# Patient Record
Sex: FEMALE | Race: BLACK OR AFRICAN AMERICAN | Employment: FULL TIME | URBAN - METROPOLITAN AREA
[De-identification: names, ages, dates, MRNs, and addresses within clinical notes are randomized per-mention and may not be internally consistent; named-entity substitution may affect disease eponyms.]

---

## 2017-12-19 ENCOUNTER — HOSPITAL ENCOUNTER (OUTPATIENT)
Dept: LAB | Age: 42
Discharge: HOME | End: 2017-12-19

## 2017-12-19 LAB — RUBV IGG SER-IMP: NORMAL

## 2017-12-19 PROCEDURE — 86762 RUBELLA ANTIBODY: CPT | Performed by: EMERGENCY MEDICINE

## 2017-12-19 PROCEDURE — 86765 RUBEOLA ANTIBODY: CPT | Performed by: EMERGENCY MEDICINE

## 2017-12-19 PROCEDURE — 86787 VARICELLA-ZOSTER ANTIBODY: CPT | Performed by: EMERGENCY MEDICINE

## 2017-12-19 PROCEDURE — 86706 HEP B SURFACE ANTIBODY: CPT | Performed by: EMERGENCY MEDICINE

## 2017-12-19 PROCEDURE — 86735 MUMPS ANTIBODY: CPT | Performed by: EMERGENCY MEDICINE

## 2017-12-20 LAB
HBV SURFACE AB SER QL IA: POSITIVE
HBV SURFACE AB SERPL IA-ACNC: 34.38 MIU/ML
HEP BS AB COMMENT,HBSAC: NORMAL
MEV IGG SER IA-ACNC: 52.3 AU/ML
MUV IGG SER IA-ACNC: 72.3 AU/ML
VZV IGG SER IA-ACNC: 990 INDEX

## 2018-06-27 ENCOUNTER — OFFICE VISIT (OUTPATIENT)
Dept: FAMILY MEDICINE CLINIC | Age: 43
End: 2018-06-27

## 2018-06-27 VITALS
OXYGEN SATURATION: 98 % | DIASTOLIC BLOOD PRESSURE: 78 MMHG | HEART RATE: 89 BPM | RESPIRATION RATE: 18 BRPM | WEIGHT: 240 LBS | HEIGHT: 64 IN | TEMPERATURE: 98.7 F | BODY MASS INDEX: 40.97 KG/M2 | SYSTOLIC BLOOD PRESSURE: 128 MMHG

## 2018-06-27 DIAGNOSIS — Z23 NEED FOR TUBERCULOSIS VACCINATION: ICD-10-CM

## 2018-06-27 DIAGNOSIS — Z02.1 PHYSICAL EXAM, PRE-EMPLOYMENT: Primary | ICD-10-CM

## 2018-06-27 DIAGNOSIS — M79.89 LEG SWELLING: ICD-10-CM

## 2018-06-27 NOTE — PROGRESS NOTES
Rah Chiang is a 40 y.o. female here today with c/o bilateral lower leg swelling. She works 12 hour shifts and commutes a total of 4 hours every day to work. She wears compression stockings which helps but it usually takes a couple of days after she isn't working for the swelling to go down. She sees Dr. Beryle Mitts in [place] for therapy. Her last pap was this year by Dr. Millie Stout. Learning assessment completed.

## 2018-06-27 NOTE — PROGRESS NOTES
HISTORY OF PRESENT ILLNESS  Jonathan Chowdhury is a 40 y.o. female. HPI  Jonathan Chowdhury is a 40 y.o. female who presents to the office today to establish care. She is a new patient. She needs a pre-employment physical for a potential new job. She comes in with c/o bilateral LE edema. 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 She also has to drive 2 hours to work and 2 hours home. She has been wearing compression socks which help some. When she is not working her legs are not swollen. She has gained weight since starting this job as well. The only food options she has while at work is a snack machine. She also does not have water there, so this limits what she can drink at work. She was working 3 jobs prior to this and did not have this problem because she was always on the go. Chief Complaint   Patient presents with    Complete Physical    Swelling       No current outpatient prescriptions on file prior to visit. No current facility-administered medications on file prior to visit. No Known Allergies  Past Medical History:   Diagnosis Date    Anemia [facility]     Depression     Nosebleed     Thrombocytopenia (HCC)      History   Smoking Status    Current Some Day Smoker    Packs/day: 0.10    Last attempt to quit: 1/1/2004   Smokeless Tobacco    Never Used     History   Alcohol Use    Yes     Family History   Problem Relation Age of Onset    Hypertension Mother     Heart Disease Mother     Hypertension Father     Cancer Maternal Grandmother     Cancer Maternal Grandfather     Cancer Other      Maternal cousin       Review of Systems   Constitutional: Negative for chills, diaphoresis, fever, malaise/fatigue and weight loss. Weight gain   Respiratory: Negative for cough, hemoptysis and shortness of breath. Cardiovascular: Positive for leg swelling. Negative for chest pain and palpitations.    Gastrointestinal: Negative for abdominal pain, constipation, diarrhea, nausea and vomiting. Musculoskeletal: Negative for falls. Skin: Negative for rash. Neurological: Negative for dizziness and headaches. Endo/Heme/Allergies: Does not bruise/bleed easily. Psychiatric/Behavioral: Negative for depression. The patient is not nervous/anxious. Visit Vitals    /78 (BP 1 Location: Right arm, BP Patient Position: Sitting)    Pulse 89    Temp 98.7 °F (37.1 °C) (Oral)    Resp 18    Ht 5' 4\" (1.626 m)    Wt 240 lb (108.9 kg)    SpO2 98%    BMI 41.2 kg/m2     Physical Exam   Constitutional: She is oriented to person, place, and time. She appears well-developed and well-nourished. No distress. Neck: Neck supple. No thyromegaly present. Cardiovascular: Normal rate, regular rhythm and normal heart sounds. Pulses:       Radial pulses are 2+ on the right side, and 2+ on the left side. Pulmonary/Chest: Effort normal and breath sounds normal. No respiratory distress. She has no wheezes. She has no rales. Musculoskeletal: She exhibits edema. Minimal trace LE edema   Neurological: She is alert and oriented to person, place, and time. Psychiatric: She has a normal mood and affect. Her behavior is normal. Thought content normal.   Nursing note and vitals reviewed. ASSESSMENT and PLAN    ICD-10-CM ICD-9-CM    1. Physical exam, pre-employment Z02.1 V70.5    2. Need for tuberculosis vaccination Z23 V03.2 AMB POC TUBERCULOSIS, INTRADERMAL (SKIN TEST)   3. Leg swelling M79.89 729.81    4. Class 3 obesity without serious comorbidity with body mass index (BMI) of 40.0 to 44.9 in adult, unspecified obesity type [facility] E66.9 278.00     Z68.41 V85.41      Completed physical form. She will try to track down her previous vaccinations. If she cannot find them, will need to order titers. PPD today. 2 day follow up for reading. We had a long discussion about her LE swelling and weight gain.  This is likely due to her current sedentary job, not drinking enough water, and eating salty foods and needs to make the appropriate changes. If symptoms worsen or develop symptoms concerning for DVT/PE, go directly to the ED. Reviewed medication and side effects. Patient agrees with the plan and verbalizes understanding. Follow-up Disposition:  Return in about 3 months (around 9/27/2018) for swelling and weight loss. Landry Mcclain PA-C  6/27/2018        Discussed the patient's BMI with her. The BMI follow up plan is as follows:     dietary management education, guidance, and counseling as discussed at visit.  encourage exercise  monitor weight    An After Visit Summary was printed and given to the patient.

## 2018-06-27 NOTE — PATIENT INSTRUCTIONS
Low Sodium Diet (2,000 Milligram): Care Instructions  Your Care Instructions    Too much sodium causes your body to hold on to extra water. This can raise your blood pressure and force your heart and kidneys to work harder. In very serious cases, this could cause you to be put in the hospital. It might even be life-threatening. By limiting sodium, you will feel better and lower your risk of serious problems. The most common source of sodium is salt. People get most of the salt in their diet from canned, prepared, and packaged foods. Fast food and restaurant meals also are very high in sodium. Your doctor will probably limit your sodium to less than 2,000 milligrams (mg) a day. This limit counts all the sodium in prepared and packaged foods and any salt you add to your food. Follow-up care is a key part of your treatment and safety. Be sure to make and go to all appointments, and call your doctor if you are having problems. It's also a good idea to know your test results and keep a list of the medicines you take. How can you care for yourself at home? Read food labels  · Read labels on cans and food packages. The labels tell you how much sodium is in each serving. Make sure that you look at the serving size. If you eat more than the serving size, you have eaten more sodium. · Food labels also tell you the Percent Daily Value for sodium. Choose products with low Percent Daily Values for sodium. · Be aware that sodium can come in forms other than salt, including monosodium glutamate (MSG), sodium citrate, and sodium bicarbonate (baking soda). MSG is often added to Asian food. When you eat out, you can sometimes ask for food without MSG or added salt. Buy low-sodium foods  · Buy foods that are labeled \"unsalted\" (no salt added), \"sodium-free\" (less than 5 mg of sodium per serving), or \"low-sodium\" (less than 140 mg of sodium per serving).  Foods labeled \"reduced-sodium\" and \"light sodium\" may still have too much sodium. Be sure to read the label to see how much sodium you are getting. · Buy fresh vegetables, or frozen vegetables without added sauces. Buy low-sodium versions of canned vegetables, soups, and other canned goods. Prepare low-sodium meals  · Cut back on the amount of salt you use in cooking. This will help you adjust to the taste. Do not add salt after cooking. One teaspoon of salt has about 2,300 mg of sodium. · Take the salt shaker off the table. · Flavor your food with garlic, lemon juice, onion, vinegar, herbs, and spices. Do not use soy sauce, lite soy sauce, steak sauce, onion salt, garlic salt, celery salt, mustard, or ketchup on your food. · Use low-sodium salad dressings, sauces, and ketchup. Or make your own salad dressings and sauces without adding salt. · Use less salt (or none) when recipes call for it. You can often use half the salt a recipe calls for without losing flavor. Other foods such as rice, pasta, and grains do not need added salt. · Rinse canned vegetables, and cook them in fresh water. This removes some-but not all-of the salt. · Avoid water that is naturally high in sodium or that has been treated with water softeners, which add sodium. Call your local water company to find out the sodium content of your water supply. If you buy bottled water, read the label and choose a sodium-free brand. Avoid high-sodium foods  · Avoid eating:  ¨ Smoked, cured, salted, and canned meat, fish, and poultry. ¨ Ham, nur, hot dogs, and luncheon meats. ¨ Regular, hard, and processed cheese and regular peanut butter. ¨ Crackers with salted tops, and other salted snack foods such as pretzels, chips, and salted popcorn. ¨ Frozen prepared meals, unless labeled low-sodium. ¨ Canned and dried soups, broths, and bouillon, unless labeled sodium-free or low-sodium. ¨ Canned vegetables, unless labeled sodium-free or low-sodium. ¨ [place] fries, pizza, tacos, and other fast foods.   Antwan Locket, olives, ketchup, and other condiments, especially soy sauce, unless labeled sodium-free or low-sodium. Where can you learn more? Go to http://leyda-brennan.info/. Enter H465 in the search box to learn more about \"Low Sodium Diet (2,000 Milligram): Care Instructions. \"  Current as of: May 12, 2017  Content Version: 11.4  © 0241-3407 [employer]. Care instructions adapted under license by [employer] (which disclaims liability or warranty for this information). If you have questions about a medical condition or this instruction, always ask your healthcare professional. [address] any warranty or liability for your use of this information. Learning About Low-Carbohydrate Diets for Weight Loss  What is a low-carbohydrate diet? Low-carb diets avoid foods that are high in carbohydrate. These high-carb foods include pasta, bread, rice, cereal, fruits, and starchy vegetables. Instead, these diets usually have you eat foods that are high in fat and protein. Many people lose weight quickly on a low-carb diet. But the early weight loss is water. People on this diet often gain the weight back after they start eating carbs again. Not all diet plans are safe or work well. A lot of the evidence shows that low-carb diets aren't healthy. That's because these diets often don't include healthy foods like fruits and vegetables. Losing weight safely means balancing protein, fat, and carbs with every meal and snack. And low-carb diets don't always provide the vitamins, minerals, and fiber you need. If you have a serious medical condition, talk to your doctor before you try any diet. These conditions include kidney disease, heart disease, type 2 diabetes, high cholesterol, and high blood pressure. If you are pregnant, it may not be safe for your baby if you are on a low-carb diet. How can you lose weight safely?   You might have heard that a diet plan helped another person lose weight. But that doesn't mean that it will work for you. It is very hard to stay on a diet that includes lots of big changes in your eating habits. If you want to get to a healthy weight and stay there, making healthy lifestyle changes will often work better than dieting. These steps can help. · Make a plan for change. Work with your doctor to create a plan that is right for you. · See a dietitian. He or she can show you how to make healthy changes in your eating habits. · Manage stress. If you have a lot of stress in your life, it can be hard to focus on making healthy changes to your daily habits. · Track your food and activity. You are likely to do better at losing weight if you keep track of what you eat and what you do. Follow-up care is a key part of your treatment and safety. Be sure to make and go to all appointments, and call your doctor if you are having problems. It's also a good idea to know your test results and keep a list of the medicines you take. Where can you learn more? Go to http://leyda-brennan.info/. Enter A121 in the search box to learn more about \"Learning About Low-Carbohydrate Diets for Weight Loss. \"  Current as of: May 12, 2017  Content Version: 11.4  © 4612-1779 [employer]. Care instructions adapted under license by [employer] (which disclaims liability or warranty for this information). If you have questions about a medical condition or this instruction, always ask your healthcare professional. [place] [address] any warranty or liability for your use of this information. Body Mass Index: Care Instructions  Your Care Instructions    Body mass index (BMI) can help you see if your weight is raising your risk for health problems. It uses a formula to compare how much you weigh with how tall you are. · A BMI lower than 18.5 is considered underweight.   · A BMI between 18.5 and 24.9 is considered healthy. · A BMI between 25 and 29.9 is considered overweight. A BMI of 30 or higher is considered obese. If your BMI is in the normal range, it means that you have a lower risk for weight-related health problems. If your BMI is in the overweight or obese range, you may be at increased risk for weight-related health problems, such as high blood pressure, heart disease, stroke, arthritis or joint pain, and diabetes. If your BMI is in the underweight range, you may be at increased risk for health problems such as fatigue, lower protection (immunity) against illness, muscle loss, bone loss, hair loss, and hormone problems. BMI is just one measure of your risk for weight-related health problems. You may be at higher risk for health problems if you are not active, you eat an unhealthy diet, or you drink too much alcohol or use tobacco products. Follow-up care is a key part of your treatment and safety. Be sure to make and go to all appointments, and call your doctor if you are having problems. It's also a good idea to know your test results and keep a list of the medicines you take. How can you care for yourself at home? · Practice healthy eating habits. This includes eating plenty of fruits, vegetables, whole grains, lean protein, and low-fat dairy. · If your doctor recommends it, get more exercise. Walking is a good choice. Bit by bit, increase the amount you walk every day. Try for at least 30 minutes on most days of the week. · Do not smoke. Smoking can increase your risk for health problems. If you need help quitting, talk to your doctor about stop-smoking programs and medicines. These can increase your chances of quitting for good. · Limit alcohol to 2 drinks a day for men and 1 drink a day for women. Too much alcohol can cause health problems. If you have a BMI higher than 25  · Your doctor may do other tests to check your risk for weight-related health problems.  This may include measuring the distance around your waist. A waist measurement of more than 40 inches in men or 35 inches in women can increase the risk of weight-related health problems. · Talk with your doctor about steps you can take to stay healthy or improve your health. You may need to make lifestyle changes to lose weight and stay healthy, such as changing your diet and getting regular exercise. If you have a BMI lower than 18.5  · Your doctor may do other tests to check your risk for health problems. · Talk with your doctor about steps you can take to stay healthy or improve your health. You may need to make lifestyle changes to gain or maintain weight and stay healthy, such as getting more healthy foods in your diet and doing exercises to build muscle. Where can you learn more? Go to http://leyda-brennan.info/. Enter S176 in the search box to learn more about \"Body Mass Index: Care Instructions. \"  Current as of: October 13, 2016  Content Version: 11.4  © 0819-2999 [employer]. Care instructions adapted under license by [employer] (which disclaims liability or warranty for this information). If you have questions about a medical condition or this instruction, always ask your healthcare professional. [address] any warranty or liability for your use of this information.

## 2018-06-29 ENCOUNTER — CLINICAL SUPPORT (OUTPATIENT)
Dept: FAMILY MEDICINE CLINIC | Age: 43
End: 2018-06-29

## 2018-06-29 DIAGNOSIS — Z23 NEED FOR TUBERCULOSIS VACCINATION: Primary | ICD-10-CM

## 2018-06-29 PROBLEM — M79.89 LEG SWELLING: Status: ACTIVE | Noted: 2018-06-29

## 2018-06-29 PROBLEM — E66.01 OBESITY, MORBID (HCC): Status: ACTIVE | Noted: 2018-06-29

## 2018-06-29 LAB
MM INDURATION POC: 0 MM (ref 0–5)
PPD POC: NEGATIVE NEGATIVE

## 2018-06-29 NOTE — PROGRESS NOTES
Uche Larson is a 40 y.o. female here this afternoon to have her PPD read. It was negative with 0 mm induration. Patient was given a copy of results and left showing no s/s of distress.

## 2018-09-14 ENCOUNTER — OFFICE VISIT (OUTPATIENT)
Dept: FAMILY MEDICINE CLINIC | Age: 43
End: 2018-09-14

## 2018-09-14 VITALS
WEIGHT: 247 LBS | HEART RATE: 96 BPM | TEMPERATURE: 98.5 F | OXYGEN SATURATION: 97 % | BODY MASS INDEX: 42.17 KG/M2 | DIASTOLIC BLOOD PRESSURE: 85 MMHG | RESPIRATION RATE: 18 BRPM | SYSTOLIC BLOOD PRESSURE: 143 MMHG | HEIGHT: 64 IN

## 2018-09-14 DIAGNOSIS — M79.10 MYALGIA: ICD-10-CM

## 2018-09-14 DIAGNOSIS — R51.9 ACUTE NONINTRACTABLE HEADACHE, UNSPECIFIED HEADACHE TYPE: ICD-10-CM

## 2018-09-14 DIAGNOSIS — R50.9 FEELS FEVERISH: Primary | ICD-10-CM

## 2018-09-14 DIAGNOSIS — J02.9 SORE THROAT: ICD-10-CM

## 2018-09-14 DIAGNOSIS — R05.9 COUGH: ICD-10-CM

## 2018-09-14 LAB
QUICKVUE INFLUENZA TEST: NEGATIVE
S PYO AG THROAT QL: NEGATIVE
VALID INTERNAL CONTROL?: YES
VALID INTERNAL CONTROL?: YES

## 2018-09-14 NOTE — MR AVS SNAPSHOT
[address] [place] [address] [address] [place], [place] [address] [address] 
[phone] Patient: Stephani Luz MRN: [id number] :1975 Visit Information Date & Time Provider Department Dept. Phone Encounter #  
 2018  9:15 AM David Malhotra PA-C [employer] [phone] [id number] Follow-up Instructions Return if symptoms worsen or fail to improve. Upcoming Health Maintenance Date Due Pneumococcal 19-64 Medium Risk (1 of 1 - PPSV23) 1995 PAP AKA CERVICAL CYTOLOGY 1997 DTaP/Tdap/Td series (1 - Tdap) 2012 Influenza Age 5 to Adult 2018 Allergies as of 2018  Review Complete On: 2018 By: David Malhotra PA-C No Known Allergies Current Immunizations  Reviewed on 2018 Name Date Hepatitis B Vaccine 2012 Influenza Vaccine Whole 2012 PPD 2012 TB Skin Test (PPD) Intradermal 2018  3:00 PM  
 TD Vaccine 2012 Not reviewed this visit You Were Diagnosed With   
  
 Codes Comments Feels feverish    -  Primary ICD-10-CM: R50.9 ICD-9-CM: 780.60 Myalgia     ICD-10-CM: M79.1 ICD-9-CM: 729.1 Sore throat     ICD-10-CM: J02.9 ICD-9-CM: 544 Cough     ICD-10-CM: R05 ICD-9-CM: 786.2 Acute nonintractable headache, unspecified headache type     ICD-10-CM: R51 ICD-9-CM: 884. 0 Vitals BP Pulse Temp Resp Height(growth percentile) Weight(growth percentile) 143/85 (BP 1 Location: Right arm, BP Patient Position: Sitting) 96 98.5 °F (36.9 °C) (Oral) 18 5' 4\" (1.626 m) 247 lb (112 kg) SpO2 BMI OB Status Smoking Status 97% 42.4 kg/m2 Ablation Current Some Day Smoker Vitals History BMI and BSA Data Body Mass Index Body Surface Area  
 42.4 kg/m 2 2.25 m 2 Your Updated Medication List  
  
Notice  As of 2018 10:09 AM  
 You have not been prescribed any medications. We Performed the Following AMB POC RAPID INFLUENZA TEST [08250 CPT(R)] AMB POC RAPID STREP A [49291 CPT(R)] Follow-up Instructions Return if symptoms worsen or fail to improve. Patient Instructions Cough: Care Instructions Your Care Instructions A cough is your body's response to something that bothers your throat or airways. Many things can cause a cough. You might cough because of a cold or the flu, bronchitis, or asthma. Smoking, postnasal drip, allergies, and stomach acid that backs up into your throat also can cause coughs. A cough is a symptom, not a disease. Most coughs stop when the cause, such as a cold, goes away. You can take a few steps at home to cough less and feel better. Follow-up care is a key part of your treatment and safety. Be sure to make and go to all appointments, and call your doctor if you are having problems. It's also a good idea to know your test results and keep a list of the medicines you take. How can you care for yourself at home? · Drink lots of water and other fluids. This helps thin the mucus and soothes a dry or sore throat. Honey or lemon juice in hot water or tea may ease a dry cough. · Take cough medicine as directed by your doctor. · Prop up your head on pillows to help you breathe and ease a dry cough. · Try cough drops to soothe a dry or sore throat. Cough drops don't stop a cough. Medicine-flavored cough drops are no better than candy-flavored drops or hard candy. · Do not smoke. Avoid secondhand smoke. If you need help quitting, talk to your doctor about stop-smoking programs and medicines. These can increase your chances of quitting for good. When should you call for help? Call 911 anytime you think you may need emergency care. For example, call if: 
  · You have severe trouble breathing.  
 Call your doctor now or seek immediate medical care if: 
  · You cough up blood.  
  · You have new or worse trouble breathing.   · You have a new or higher fever.  
  · You have a new rash.  
 Watch closely for changes in your health, and be sure to contact your doctor if: 
  · You cough more deeply or more often, especially if you notice more mucus or a change in the color of your mucus.  
  · You have new symptoms, such as a sore throat, an earache, or sinus pain.  
  · You do not get better as expected. Where can you learn more? Go to http://leyda-brennan.info/. Enter D279 in the search box to learn more about \"Cough: Care Instructions. \" Current as of: December 6, 2017 Content Version: 11.7 © 2583-6672 [employer]. Care instructions adapted under license by [employer] (which disclaims liability or warranty for this information). If you have questions about a medical condition or this instruction, always ask your healthcare professional. [place] [address] any warranty or liability for your use of this information. Sore Throat: Care Instructions Your Care Instructions Infection by bacteria or a virus causes most sore throats. Cigarette smoke, dry air, air pollution, allergies, and yelling can also cause a sore throat. Sore throats can be painful and annoying. Fortunately, most sore throats go away on their own. If you have a bacterial infection, your doctor may prescribe antibiotics. Follow-up care is a key part of your treatment and safety. Be sure to make and go to all appointments, and call your doctor if you are having problems. It's also a good idea to know your test results and keep a list of the medicines you take. How can you care for yourself at home? · If your doctor prescribed antibiotics, take them as directed. Do not stop taking them just because you feel better. You need to take the full course of antibiotics.  
· Gargle with warm salt water once an hour to help reduce swelling and relieve discomfort. Use 1 teaspoon of salt mixed in 1 cup of warm water. · Take an over-the-counter pain medicine, such as acetaminophen (Tylenol), ibuprofen (Advil, Motrin), or naproxen (Aleve). Read and follow all instructions on the label. · Be careful when taking over-the-counter cold or flu medicines and Tylenol at the same time. Many of these medicines have acetaminophen, which is Tylenol. Read the labels to make sure that you are not taking more than the recommended dose. Too much acetaminophen (Tylenol) can be harmful. · Drink plenty of fluids. Fluids may help soothe an irritated throat. Hot fluids, such as tea or soup, may help decrease throat pain. · Use over-the-counter throat lozenges to soothe pain. Regular cough drops or hard candy may also help. These should not be given to young children because of the risk of choking. · Do not smoke or allow others to smoke around you. If you need help quitting, talk to your doctor about stop-smoking programs and medicines. These can increase your chances of quitting for good. · Use a vaporizer or humidifier to add moisture to your bedroom. Follow the directions for cleaning the machine. When should you call for help? Call your doctor now or seek immediate medical care if: 
  · You have new or worse trouble swallowing.  
  · Your sore throat gets much worse on one side.  
 Watch closely for changes in your health, and be sure to contact your doctor if you do not get better as expected. Where can you learn more? Go to http://leyda-brennan.info/. Enter 062 441 80 19 in the search box to learn more about \"Sore Throat: Care Instructions. \" Current as of: May 12, 2017 Content Version: 11.7 © 0072-6065 "[employer]". Care instructions adapted under license by [employer] (which disclaims liability or warranty for this information).  If you have questions about a medical condition or this instruction, always ask your healthcare professional. [place] [address] any warranty or liability for your use of this information. Introducing [facility]! Norma Burgos introduces Rockpack patient portal. Now you can access parts of your medical record, email your doctor's office, and request medication refills online. 1. In your internet browser, go to [email or web address] 2. Click on the First Time User? Click Here link in the Sign In box. You will see the New Member Sign Up page. 3. Enter your Rockpack Access Code exactly as it appears below. You will not need to use this code after youve completed the sign-up process. If you do not sign up before the expiration date, you must request a new code. · Rockpack Access Code: [email or web address] Expires: 9/25/2018  2:15 PM 
 
4. Enter the last four digits of your Social Security Number (xxxx) and Date of Birth (mm/dd/yyyy) as indicated and click Submit. You will be taken to the next sign-up page. 5. Create a Rockpack ID. This will be your Rockpack login ID and cannot be changed, so think of one that is secure and easy to remember. 6. Create a Rockpack password. You can change your password at any time. 7. Enter your Password Reset Question and Answer. This can be used at a later time if you forget your password. 8. Enter your e-mail address. You will receive e-mail notification when new information is available in [address] 9. Click Sign Up. You can now view and download portions of your medical record. 10. Click the Download Summary menu link to download a portable copy of your medical information. If you have questions, please visit the Frequently Asked Questions section of the Rockpack website. Remember, Rockpack is NOT to be used for urgent needs. For medical emergencies, dial 911. Now available from your iPhone and Android! Please provide this summary of care documentation to your next provider. Your primary care clinician is listed as Sobeida Roman. If you have any questions after today's visit, please call [phone].

## 2018-09-14 NOTE — PATIENT INSTRUCTIONS
Cough: Care Instructions  Your Care Instructions    A cough is your body's response to something that bothers your throat or airways. Many things can cause a cough. You might cough because of a cold or the flu, bronchitis, or asthma. Smoking, postnasal drip, allergies, and stomach acid that backs up into your throat also can cause coughs. A cough is a symptom, not a disease. Most coughs stop when the cause, such as a cold, goes away. You can take a few steps at home to cough less and feel better. Follow-up care is a key part of your treatment and safety. Be sure to make and go to all appointments, and call your doctor if you are having problems. It's also a good idea to know your test results and keep a list of the medicines you take. How can you care for yourself at home? · Drink lots of water and other fluids. This helps thin the mucus and soothes a dry or sore throat. Honey or lemon juice in hot water or tea may ease a dry cough. · Take cough medicine as directed by your doctor. · Prop up your head on pillows to help you breathe and ease a dry cough. · Try cough drops to soothe a dry or sore throat. Cough drops don't stop a cough. Medicine-flavored cough drops are no better than candy-flavored drops or hard candy. · Do not smoke. Avoid secondhand smoke. If you need help quitting, talk to your doctor about stop-smoking programs and medicines. These can increase your chances of quitting for good. When should you call for help? Call 911 anytime you think you may need emergency care.  For example, call if:    · You have severe trouble breathing.    Call your doctor now or seek immediate medical care if:    · You cough up blood.     · You have new or worse trouble breathing.     · You have a new or higher fever.     · You have a new rash.    Watch closely for changes in your health, and be sure to contact your doctor if:    · You cough more deeply or more often, especially if you notice more mucus or a change in the color of your mucus.     · You have new symptoms, such as a sore throat, an earache, or sinus pain.     · You do not get better as expected. Where can you learn more? Go to http://leyda-brennan.info/. Enter D279 in the search box to learn more about \"Cough: Care Instructions. \"  Current as of: December 6, 2017  Content Version: 11.7  © 4441-1354 [employer]. Care instructions adapted under license by [employer] (which disclaims liability or warranty for this information). If you have questions about a medical condition or this instruction, always ask your healthcare professional. [address] any warranty or liability for your use of this information. Sore Throat: Care Instructions  Your Care Instructions    Infection by bacteria or a virus causes most sore throats. Cigarette smoke, dry air, air pollution, allergies, and yelling can also cause a sore throat. Sore throats can be painful and annoying. Fortunately, most sore throats go away on their own. If you have a bacterial infection, your doctor may prescribe antibiotics. Follow-up care is a key part of your treatment and safety. Be sure to make and go to all appointments, and call your doctor if you are having problems. It's also a good idea to know your test results and keep a list of the medicines you take. How can you care for yourself at home? · If your doctor prescribed antibiotics, take them as directed. Do not stop taking them just because you feel better. You need to take the full course of antibiotics. · Gargle with warm salt water once an hour to help reduce swelling and relieve discomfort. Use 1 teaspoon of salt mixed in 1 cup of warm water. · Take an over-the-counter pain medicine, such as acetaminophen (Tylenol), ibuprofen (Advil, Motrin), or naproxen (Aleve). Read and follow all instructions on the label.   · Be careful when taking over-the-counter cold or flu medicines and Tylenol at the same time. Many of these medicines have acetaminophen, which is Tylenol. Read the labels to make sure that you are not taking more than the recommended dose. Too much acetaminophen (Tylenol) can be harmful. · Drink plenty of fluids. Fluids may help soothe an irritated throat. Hot fluids, such as tea or soup, may help decrease throat pain. · Use over-the-counter throat lozenges to soothe pain. Regular cough drops or hard candy may also help. These should not be given to young children because of the risk of choking. · Do not smoke or allow others to smoke around you. If you need help quitting, talk to your doctor about stop-smoking programs and medicines. These can increase your chances of quitting for good. · Use a vaporizer or humidifier to add moisture to your bedroom. Follow the directions for cleaning the machine. When should you call for help? Call your doctor now or seek immediate medical care if:    · You have new or worse trouble swallowing.     · Your sore throat gets much worse on one side.    Watch closely for changes in your health, and be sure to contact your doctor if you do not get better as expected. Where can you learn more? Go to http://leyda-brennan.info/. Enter 062 441 80 19 in the search box to learn more about \"Sore Throat: Care Instructions. \"  Current as of: May 12, 2017  Content Version: 11.7  © 2351-5118 [employer]. Care instructions adapted under license by [employer]. (which disclaims liability or warranty for this information). If you have questions about a medical condition or this instruction, always ask your healthcare professional. [address] any warranty or liability for your use of this information.

## 2018-09-14 NOTE — PROGRESS NOTES
Magda Thompson is a 44 y.o. female c/o flu like symptoms x 2 days, sore throat and ear feels clogged, congestion and headache, chills.

## 2018-09-14 NOTE — PROGRESS NOTES
HISTORY OF PRESENT ILLNESS  Sergio Prather is a 44 y.o. female. HPI  Sergio Prather is a 44 y.o. female who presents to the office today for flu-like symptoms. She comes in today for an acute visit. She has c/o flu-like symptoms x 2 days. She has nasal congestion, dry cough, sore throat, frontal headache, nausea without vomiting, chills and feels feverish, myalgia and fatigue. Family members at home have similar symptoms. SHe tried otc cough medication and ibuprofen which provided little relief. Chief Complaint   Patient presents with    Chills    Nasal Congestion       No current outpatient prescriptions on file prior to visit. No current facility-administered medications on file prior to visit. No Known Allergies  Past Medical History:   Diagnosis Date    Anemia NEC     Depression     Nosebleed     Thrombocytopenia (HCC)      History   Smoking Status    Current Some Day Smoker    Packs/day: 0.10    Last attempt to quit: 1/1/2004   Smokeless Tobacco    Never Used     History   Alcohol Use    Yes     Family History   Problem Relation Age of Onset    Hypertension Mother     Heart Disease Mother     Hypertension Father     Cancer Maternal Grandmother     Cancer Maternal Grandfather     Cancer Other      Maternal cousin       Review of Systems   Constitutional: Positive for chills, diaphoresis and malaise/fatigue. HENT: Positive for congestion, ear pain and sore throat. Negative for ear discharge and sinus pain. Eyes: Negative for discharge and redness. Respiratory: Positive for cough. Negative for sputum production and shortness of breath. Cardiovascular: Negative for chest pain. Gastrointestinal: Positive for nausea. Negative for abdominal pain, constipation, diarrhea and vomiting. Genitourinary: Negative for dysuria. Musculoskeletal: Positive for myalgias. Skin: Negative for rash. Neurological: Positive for headaches.  Negative for dizziness and loss of consciousness. Visit Vitals    /85 (BP 1 Location: Right arm, BP Patient Position: Sitting)    Pulse 96    Temp 98.5 °F (36.9 °C) (Oral)    Resp 18    Ht 5' 4\" (1.626 m)    Wt 247 lb (112 kg)    SpO2 97%    BMI 42.4 kg/m2     Physical Exam   Constitutional: She is oriented to person, place, and time. She appears well-developed and well-nourished. No distress. HENT:   Head: Atraumatic. Right Ear: Tympanic membrane, external ear and ear canal normal.   Left Ear: Tympanic membrane, external ear and ear canal normal.   Nose: Mucosal edema present. No sinus tenderness. Right sinus exhibits no maxillary sinus tenderness and no frontal sinus tenderness. Left sinus exhibits no maxillary sinus tenderness and no frontal sinus tenderness. Mouth/Throat: Uvula is midline and mucous membranes are normal. Posterior oropharyngeal erythema present. No oropharyngeal exudate. Eyes: Conjunctivae are normal. Pupils are equal, round, and reactive to light. Neck: Neck supple. Cardiovascular: Normal rate, regular rhythm and normal heart sounds. Pulmonary/Chest: Effort normal and breath sounds normal. No respiratory distress. She has no wheezes. She has no rales. Abdominal: Soft. She exhibits no distension. There is no tenderness. Lymphadenopathy:     She has cervical adenopathy. Neurological: She is alert and oriented to person, place, and time. Skin: Skin is warm and dry. Psychiatric: She has a normal mood and affect. Her behavior is normal. Thought content normal.   Nursing note and vitals reviewed.     Recent Results (from the past 12 hour(s))   AMB POC RAPID STREP A    Collection Time: 09/14/18  9:50 AM   Result Value Ref Range    VALID INTERNAL CONTROL POC Yes     Group A Strep Ag Negative Negative   AMB POC RAPID INFLUENZA TEST    Collection Time: 09/14/18  9:50 AM   Result Value Ref Range    VALID INTERNAL CONTROL POC Yes     QuickVue Influenza test Negative Negative     ASSESSMENT and PLAN    ICD-10-CM ICD-9-CM    1. Feels feverish R50.9 780.60 AMB POC RAPID STREP A      AMB POC RAPID INFLUENZA TEST   2. Myalgia M79.1 729.1 AMB POC RAPID STREP A      AMB POC RAPID INFLUENZA TEST   3. Sore throat J02.9 462 AMB POC RAPID STREP A      AMB POC RAPID INFLUENZA TEST   4. Cough R05 786.2 AMB POC RAPID STREP A      AMB POC RAPID INFLUENZA TEST   5. Acute nonintractable headache, unspecified headache type R51 784.0 AMB POC RAPID STREP A      AMB POC RAPID INFLUENZA TEST      Flu and strep negative. Treat symptomatically. Increase fluids and rest. Given work note to return tomorrow. Reviewed medication and side effects. Patient agrees with the plan and verbalizes understanding. Follow-up Disposition:  Return if symptoms worsen or fail to improve.     Tae Frederick PA-C  9/14/2018

## 2018-09-14 NOTE — LETTER
NOTIFICATION RETURN TO WORK / SCHOOL 
 
9/14/2018 10:07 AM 
 
Ms. Ro Brown [address] [place] [id number] To Whom It May Concern: 
 
Ro Brown is currently under the care of Lex Santana. She will return to work/school on: 9/15/2018 If there are questions or concerns please have the patient contact our office.  
 
 
 
Sincerely, 
 
 
Gurvinder Alexis PA-C

## 2018-10-08 ENCOUNTER — TELEPHONE (OUTPATIENT)
Dept: FAMILY MEDICINE CLINIC | Age: 43
End: 2018-10-08

## 2018-10-08 NOTE — TELEPHONE ENCOUNTER
Patient is requesting an order for a mammogram and she states she's fine with going to [facility].

## 2018-10-10 DIAGNOSIS — Z12.39 BREAST CANCER SCREENING: Primary | ICD-10-CM

## 2018-11-06 ENCOUNTER — TELEPHONE (OUTPATIENT)
Dept: FAMILY MEDICINE CLINIC | Age: 43
End: 2018-11-06

## 2018-11-06 NOTE — TELEPHONE ENCOUNTER
Patient called stating she is having numbness and tingling in her left arm and hand. She denies any chest pain or SOB. She was placed on 2 new meds from her dermatologist and she is unsure if it is coming from that or from anxiety. She was advised to go to the ER if she has chest pain, SOB, or sweating. She is agreeable and is currently scheduled to come in tomorrow to be seen by [facility].

## 2018-11-07 ENCOUNTER — HOSPITAL ENCOUNTER (OUTPATIENT)
Dept: LAB | Age: 43
Discharge: HOME | End: 2018-11-07
Payer: COMMERCIAL

## 2018-11-07 ENCOUNTER — OFFICE VISIT (OUTPATIENT)
Dept: FAMILY MEDICINE CLINIC | Age: 43
End: 2018-11-07

## 2018-11-07 VITALS
HEIGHT: 64 IN | TEMPERATURE: 98.5 F | SYSTOLIC BLOOD PRESSURE: 129 MMHG | DIASTOLIC BLOOD PRESSURE: 73 MMHG | OXYGEN SATURATION: 97 % | WEIGHT: 243 LBS | HEART RATE: 87 BPM | BODY MASS INDEX: 41.48 KG/M2 | RESPIRATION RATE: 18 BRPM

## 2018-11-07 DIAGNOSIS — E66.01 CLASS 3 SEVERE OBESITY WITH BODY MASS INDEX (BMI) OF 40.0 TO 44.9 IN ADULT, UNSPECIFIED OBESITY TYPE, UNSPECIFIED WHETHER SERIOUS COMORBIDITY PRESENT (HCC): ICD-10-CM

## 2018-11-07 DIAGNOSIS — R20.0 NUMBNESS AND TINGLING IN LEFT HAND: ICD-10-CM

## 2018-11-07 DIAGNOSIS — R00.2 PALPITATIONS: ICD-10-CM

## 2018-11-07 DIAGNOSIS — R07.9 CHEST PAIN, UNSPECIFIED TYPE: ICD-10-CM

## 2018-11-07 DIAGNOSIS — R20.0 LEFT ARM NUMBNESS: ICD-10-CM

## 2018-11-07 DIAGNOSIS — R07.9 CHEST PAIN, UNSPECIFIED TYPE: Primary | ICD-10-CM

## 2018-11-07 DIAGNOSIS — Z23 ENCOUNTER FOR IMMUNIZATION: ICD-10-CM

## 2018-11-07 DIAGNOSIS — R20.2 NUMBNESS AND TINGLING IN LEFT HAND: ICD-10-CM

## 2018-11-07 DIAGNOSIS — R06.83 SNORING: ICD-10-CM

## 2018-11-07 LAB
ALBUMIN SERPL-MCNC: 3.8 G/DL (ref 3.4–5)
ALBUMIN/GLOB SERPL: 1 {RATIO} (ref 0.8–1.7)
ALP SERPL-CCNC: 60 U/L (ref 45–117)
ALT SERPL-CCNC: 30 U/L (ref 13–56)
ANION GAP SERPL CALC-SCNC: 9 MMOL/L (ref 3–18)
AST SERPL-CCNC: 15 U/L (ref 15–37)
BASOPHILS # BLD: 0 K/UL (ref 0–0.1)
BASOPHILS NFR BLD: 0 % (ref 0–2)
BILIRUB SERPL-MCNC: 0.4 MG/DL (ref 0.2–1)
BUN SERPL-MCNC: 13 MG/DL (ref 7–18)
BUN/CREAT SERPL: 14 (ref 12–20)
CALCIUM SERPL-MCNC: 8.8 MG/DL (ref 8.5–10.1)
CHLORIDE SERPL-SCNC: 106 MMOL/L (ref 100–108)
CHOLEST SERPL-MCNC: 179 MG/DL
CO2 SERPL-SCNC: 26 MMOL/L (ref 21–32)
CREAT SERPL-MCNC: 0.9 MG/DL (ref 0.6–1.3)
DIFFERENTIAL METHOD BLD: ABNORMAL
EOSINOPHIL # BLD: 0.1 K/UL (ref 0–0.4)
EOSINOPHIL NFR BLD: 1 % (ref 0–5)
ERYTHROCYTE [DISTWIDTH] IN BLOOD BY AUTOMATED COUNT: 16.5 % (ref 11.6–14.5)
GLOBULIN SER CALC-MCNC: 3.7 G/DL (ref 2–4)
GLUCOSE SERPL-MCNC: 73 MG/DL (ref 74–99)
HCT VFR BLD AUTO: 40.9 % (ref 35–45)
HDLC SERPL-MCNC: 60 MG/DL (ref 40–60)
HDLC SERPL: 3 {RATIO} (ref 0–5)
HGB BLD-MCNC: 13 G/DL (ref 12–16)
LDLC SERPL CALC-MCNC: 94.8 MG/DL (ref 0–100)
LIPID PROFILE,FLP: NORMAL
LYMPHOCYTES # BLD: 2.4 K/UL (ref 0.9–3.6)
LYMPHOCYTES NFR BLD: 27 % (ref 21–52)
MAGNESIUM SERPL-MCNC: 2 MG/DL (ref 1.6–2.6)
MCH RBC QN AUTO: 24.4 PG (ref 24–34)
MCHC RBC AUTO-ENTMCNC: 31.8 G/DL (ref 31–37)
MCV RBC AUTO: 76.7 FL (ref 74–97)
MONOCYTES # BLD: 0.8 K/UL (ref 0.05–1.2)
MONOCYTES NFR BLD: 9 % (ref 3–10)
NEUTS SEG # BLD: 5.5 K/UL (ref 1.8–8)
NEUTS SEG NFR BLD: 63 % (ref 40–73)
PLATELET # BLD AUTO: 109 K/UL (ref 135–420)
POTASSIUM SERPL-SCNC: 4.4 MMOL/L (ref 3.5–5.5)
PROT SERPL-MCNC: 7.5 G/DL (ref 6.4–8.2)
RBC # BLD AUTO: 5.33 M/UL (ref 4.2–5.3)
SODIUM SERPL-SCNC: 141 MMOL/L (ref 136–145)
T4 FREE SERPL-MCNC: 1 NG/DL (ref 0.7–1.5)
TRIGL SERPL-MCNC: 121 MG/DL (ref ?–150)
TSH SERPL DL<=0.05 MIU/L-ACNC: 0.6 UIU/ML (ref 0.36–3.74)
VLDLC SERPL CALC-MCNC: 24.2 MG/DL
WBC # BLD AUTO: 8.8 K/UL (ref 4.6–13.2)

## 2018-11-07 PROCEDURE — 80061 LIPID PANEL: CPT | Performed by: PHYSICIAN ASSISTANT

## 2018-11-07 PROCEDURE — 84439 ASSAY OF FREE THYROXINE: CPT | Performed by: PHYSICIAN ASSISTANT

## 2018-11-07 PROCEDURE — 80053 COMPREHEN METABOLIC PANEL: CPT | Performed by: PHYSICIAN ASSISTANT

## 2018-11-07 PROCEDURE — 85025 COMPLETE CBC W/AUTO DIFF WBC: CPT | Performed by: PHYSICIAN ASSISTANT

## 2018-11-07 PROCEDURE — 83735 ASSAY OF MAGNESIUM: CPT | Performed by: PHYSICIAN ASSISTANT

## 2018-11-07 RX ORDER — SPIRONOLACTONE 50 MG/1
TABLET, FILM COATED ORAL DAILY
COMMUNITY

## 2018-11-07 RX ORDER — FINASTERIDE 5 MG/1
5 TABLET, FILM COATED ORAL DAILY
COMMUNITY

## 2018-11-07 NOTE — PATIENT INSTRUCTIONS
Palpitations: Care Instructions  Your Care Instructions    Heart palpitations are the uncomfortable sensation that your heart is beating fast or irregularly. You might feel pounding or fluttering in your chest. It might feel like your heart is skipping a beat. Although palpitations may be caused by a heart problem, they also occur because of stress, fatigue, or use of alcohol, caffeine, or nicotine. Many medicines, including diet pills, antihistamines, decongestants, and some herbal products, can cause heart palpitations. Nearly everyone has palpitations from time to time. Depending on your symptoms, your doctor may need to do more tests to try to find the cause of your palpitations. Follow-up care is a key part of your treatment and safety. Be sure to make and go to all appointments, and call your doctor if you are having problems. It's also a good idea to know your test results and keep a list of the medicines you take. How can you care for yourself at home? · Avoid caffeine, nicotine, and excess alcohol. · Do not take illegal drugs, such as methamphetamines and cocaine. · Do not take weight loss or diet medicines unless you talk with your doctor first.  · Get plenty of sleep. · Do not overeat. · If you have palpitations again, take deep breaths and try to relax. This may slow a racing heart. · If you start to feel lightheaded, lie down to avoid injuries that might result if you pass out and fall down. · Keep a record of your palpitations and bring it to your next doctor's appointment. Write down:  ? The date and time. ? Your pulse. (If your heart is beating fast, it may be hard to count your pulse.)  ? What you were doing when the palpitations started. ? How long the palpitations lasted. ? Any other symptoms. · If an activity causes palpitations, slow down or stop. Talk to your doctor before you do that activity again. · Take your medicines exactly as prescribed.  Call your doctor if you think you are having a problem with your medicine. When should you call for help? Call 911 anytime you think you may need emergency care. For example, call if:    · You passed out (lost consciousness).     · You have symptoms of a heart attack. These may include:  ? Chest pain or pressure, or a strange feeling in the chest.  ? Sweating. ? Shortness of breath. ? Pain, pressure, or a strange feeling in the back, neck, jaw, or upper belly or in one or both shoulders or arms. ? Lightheadedness or sudden weakness. ? A fast or irregular heartbeat. After you call 911, the  may tell you to chew 1 adult-strength or 2 to 4 low-dose aspirin. Wait for an ambulance. Do not try to drive yourself.     · You have symptoms of a stroke. These may include:  ? Sudden numbness, tingling, weakness, or loss of movement in your face, arm, or leg, especially on only one side of your body. ? Sudden vision changes. ? Sudden trouble speaking. ? Sudden confusion or trouble understanding simple statements. ? Sudden problems with walking or balance. ? A sudden, severe headache that is different from past headaches.    Call your doctor now or seek immediate medical care if:    · You have heart palpitations and:  ? Are dizzy or lightheaded, or you feel like you may faint. ? Have new or increased shortness of breath.    Watch closely for changes in your health, and be sure to contact your doctor if:    · You continue to have heart palpitations. Where can you learn more? Go to http://leyda-brennan.info/. Enter R508 in the search box to learn more about \"Palpitations: Care Instructions. \"  Current as of: December 6, 2017  Content Version: 11.8  © 6902-1452 [employer]. Care instructions adapted under license by [employer] (which disclaims liability or warranty for this information).  If you have questions about a medical condition or this instruction, always ask your healthcare professional. [employer] disclaims any warranty or liability for your use of this information.

## 2018-11-07 NOTE — PROGRESS NOTES
Magnolia Hall is a 44 y.o. female c/o feeling weird feeling in her chest and down L arm. She is concerned that it may be an effect of her medication or just anxiety.

## 2018-11-07 NOTE — PROGRESS NOTES
HISTORY OF PRESENT ILLNESS  Yolande Conrad is a 44 y.o. female. HPI  Yolande Conrad is a 44 y.o. female who presents to the office today for chest discomfort,  She comes in today with c/o chest discomfort. It is located on the left side of the chest. She has a difficult time describing the pain. Says it feels \"weird\" and numb. She also reports left arm numbness down into her left hand, and a pain in the left side of her jaw. It is intermittent, not exacerbated with exertion. She does have SOB, but attributes this to weight and anxiety. She also mentions that she was started on 2 new medications by Derm about 2 weeks ago and her symptoms started a few days ago. The new meds are Aldactone and Proscar. She denies N/V, palpitations, dizziness, sweats, HA, LE edema. Chief Complaint   Patient presents with    Numbness       Current Outpatient Medications on File Prior to Visit   Medication Sig Dispense Refill    spironolactone (ALDACTONE) 50 mg tablet Take  by mouth daily.  finasteride (PROSCAR) 5 mg tablet Take 5 mg by mouth daily. No current facility-administered medications on file prior to visit. No Known Allergies  Past Medical History:   Diagnosis Date    Anemia NEC     Depression     Nosebleed     Thrombocytopenia (HCC)      Social History     Tobacco Use   Smoking Status Current Some Day Smoker    Packs/day: 0.10    Last attempt to quit: 2004    Years since quittin.8   Smokeless Tobacco Never Used     Social History     Substance and Sexual Activity   Alcohol Use Yes     Family History   Problem Relation Age of Onset    Hypertension Mother     Heart Disease Mother     Hypertension Father     Cancer Maternal Grandmother     Cancer Maternal Grandfather     Cancer Other         Maternal cousin     Review of Systems   Constitutional: Negative for diaphoresis, fever, malaise/fatigue and weight loss. Eyes: Negative for blurred vision and double vision.    Respiratory: Positive for shortness of breath. Negative for cough. Cardiovascular: Positive for chest pain and palpitations. Negative for orthopnea and leg swelling. Gastrointestinal: Negative for abdominal pain, nausea and vomiting. Musculoskeletal: Negative for back pain. Neurological: Negative for dizziness, loss of consciousness, weakness and headaches. Psychiatric/Behavioral: The patient is nervous/anxious. Visit Vitals  /73 (BP 1 Location: Left arm, BP Patient Position: Sitting)   Pulse 87   Temp 98.5 °F (36.9 °C) (Oral)   Resp 18   Ht 5' 4\" (1.626 m)   Wt 243 lb (110.2 kg)   SpO2 97%   BMI 41.71 kg/m²     Physical Exam   Constitutional: She is oriented to person, place, and time. She appears well-developed and well-nourished. No distress. Neck: Normal range of motion. Neck supple. Cardiovascular: Normal rate, regular rhythm and normal heart sounds. Frequent extrasystoles are present. Pulses:       Radial pulses are 2+ on the right side, and 2+ on the left side. Pulmonary/Chest: Effort normal and breath sounds normal. No respiratory distress. She has no wheezes. Musculoskeletal: She exhibits no edema. Left shoulder: Normal.        Left hand: Normal.   Neurological: She is alert and oriented to person, place, and time. Skin: Skin is warm and dry. Psychiatric: She has a normal mood and affect. Her speech is normal and behavior is normal. Thought content normal.   Nursing note and vitals reviewed. EKG done in the office shows HR 72, SR with frequent ectopy. I do not have previous tracings to compare    ASSESSMENT and PLAN    ICD-10-CM ICD-9-CM    1.  Chest pain, unspecified type R07.9 786.50 EKG, 12 LEAD, INITIAL      AMB POC EKG ROUTINE W/ 12 LEADS, INTER & REP      CBC WITH AUTOMATED DIFF      METABOLIC PANEL, COMPREHENSIVE      LIPID PANEL      TSH AND FREE T4      MAGNESIUM   2. Left arm numbness R20.0 782.0 CBC WITH AUTOMATED DIFF      METABOLIC PANEL, COMPREHENSIVE      LIPID PANEL      TSH AND FREE T4      MAGNESIUM   3. Numbness and tingling in left hand R20.0 782.0 CBC WITH AUTOMATED DIFF    P50.2  METABOLIC PANEL, COMPREHENSIVE      LIPID PANEL      TSH AND FREE T4      MAGNESIUM   4. Class 3 severe obesity with body mass index (BMI) of 40.0 to 44.9 in adult, unspecified obesity type, unspecified whether serious comorbidity present ([facility] E66.01 278.01 CBC WITH AUTOMATED DIFF    S71.82 K99.10 METABOLIC PANEL, COMPREHENSIVE      LIPID PANEL      TSH AND FREE T4   5. Palpitations R00.2 785.1 CBC WITH AUTOMATED DIFF      METABOLIC PANEL, COMPREHENSIVE      LIPID PANEL      TSH AND FREE T4      MAGNESIUM   6. Snoring R06.83 786.09    7. Encounter for immunization Z23 V03.89 INFLUENZA VIRUS VAC QUAD,SPLIT,PRESV FREE SYRINGE IM      EKG does not show acute ST-T wave changes. I am order labs today, especially electrolytes given the frequent ectopy. We discussed outpatient stress test.   We discussed weight loss. She should also consider sleep study to r/o TARYN. She will think about this and let me know when she would like to proceed. Anxiety can also play a part in her symptoms, but we need to r/o other etiologies first.   Reviewed medication and side effects. Patient agrees with the plan and verbalizes understanding. Follow-up Disposition:  Return in about 1 month (around 12/7/2018) for palpitations, CP.     Ho Corona PA-C  11/7/2018

## 2018-11-08 ENCOUNTER — OFFICE VISIT (OUTPATIENT)
Dept: FAMILY MEDICINE CLINIC | Age: 43
End: 2018-11-08

## 2018-11-08 ENCOUNTER — HOSPITAL ENCOUNTER (OUTPATIENT)
Dept: LAB | Age: 43
Discharge: HOME | End: 2018-11-08
Payer: COMMERCIAL

## 2018-11-08 VITALS — BODY MASS INDEX: 41.83 KG/M2 | HEIGHT: 64 IN | WEIGHT: 245 LBS

## 2018-11-08 DIAGNOSIS — J02.9 ACUTE PHARYNGITIS, UNSPECIFIED ETIOLOGY: ICD-10-CM

## 2018-11-08 DIAGNOSIS — J02.9 ACUTE PHARYNGITIS, UNSPECIFIED ETIOLOGY: Primary | ICD-10-CM

## 2018-11-08 DIAGNOSIS — J02.9 SORE THROAT: ICD-10-CM

## 2018-11-08 LAB
S PYO AG THROAT QL: NORMAL
VALID INTERNAL CONTROL?: NO

## 2018-11-08 PROCEDURE — 87070 CULTURE OTHR SPECIMN AEROBIC: CPT

## 2018-11-08 RX ORDER — CHLORHEXIDINE GLUCONATE 1.2 MG/ML
15 RINSE ORAL 2 TIMES DAILY
Qty: 420 ML | Refills: 0 | Status: SHIPPED | OUTPATIENT
Start: 2018-11-08 | End: 2018-11-22

## 2018-11-08 NOTE — PROGRESS NOTES
CC: Sore Throat    Subjective:   Gill Palacios is a 44 y.o. female who complains of sore throat and right ear discomfort for a couple of days, gradually worsening since that time. Recently had a root canal. Look inside her mouth and saw a white area on her left throat. She denies a history of chills, fevers, nausea,and vomiting, nasal congestion. Review of Systems  Pertinent items are noted in HPI. Objective:     Visit Vitals  BP (P) 131/82 (BP 1 Location: Right arm, BP Patient Position: Sitting)   Pulse (P) 94   Temp (P) 98.5 °F (36.9 °C) (Oral)   Resp (P) 18   Ht 5' 4\" (1.626 m)   Wt 245 lb (111.1 kg)   SpO2 (P) 99%   BMI 42.05 kg/m²     General:  alert, cooperative, no distress   Eyes: conjunctivae/corneas clear. PERRL, EOM's intact. Fundi benign   Ears: normal TM's and external ear canals AU   Sinuses: Normal paranasal sinuses without tenderness   Mouth:  Lips, mucosa, and tongue normal. Teeth and gums normal, Left tonsil  large lesion  On left tonsil with white exudate; mild erythema. Neck: supple, symmetrical, trachea midline; left adenopathy. RESULTS:  Results for orders placed or performed in visit on 11/08/18   AMB POC RAPID STREP A   Result Value Ref Range    VALID INTERNAL CONTROL POC No     Group A Strep Ag Neg-culture sent Negative        Assessment/Plan:       ICD-10-CM ICD-9-CM    1. Acute pharyngitis, unspecified etiology J02.9 462 chlorhexidine (PERIDEX) 0.12 % solution      THROAT CULTURE   2. Sore throat J02.9 462 AMB POC RAPID STREP A     I have discussed the diagnosis with the patient and the intended plan as seen in the above orders. The patient has received an after-visit summary along with patient information handout. I have discussed medication side effects and warnings with the patient as well. Pt verbalized understanding. Follow-up Disposition:  Return if symptoms worsen or fail to improve, for Jessica.   KAISER Edgar  11/8/2018, 11:38 AM

## 2018-11-08 NOTE — PROGRESS NOTES
Claudia James is a 44 y.o. female here today with c/o sore throat x a couple of days. She saw a white area on her left tonsils. She denies fever but feels a discomfort in her left ear.

## 2018-11-08 NOTE — PATIENT INSTRUCTIONS
Sore Throat: Care Instructions  Your Care Instructions    Infection by bacteria or a virus causes most sore throats. Cigarette smoke, dry air, air pollution, allergies, and yelling can also cause a sore throat. Sore throats can be painful and annoying. Fortunately, most sore throats go away on their own. If you have a bacterial infection, your doctor may prescribe antibiotics. Follow-up care is a key part of your treatment and safety. Be sure to make and go to all appointments, and call your doctor if you are having problems. It's also a good idea to know your test results and keep a list of the medicines you take. How can you care for yourself at home? · If your doctor prescribed antibiotics, take them as directed. Do not stop taking them just because you feel better. You need to take the full course of antibiotics. · Gargle with warm salt water once an hour to help reduce swelling and relieve discomfort. Use 1 teaspoon of salt mixed in 1 cup of warm water. · Take an over-the-counter pain medicine, such as acetaminophen (Tylenol), ibuprofen (Advil, Motrin), or naproxen (Aleve). Read and follow all instructions on the label. · Be careful when taking over-the-counter cold or flu medicines and Tylenol at the same time. Many of these medicines have acetaminophen, which is Tylenol. Read the labels to make sure that you are not taking more than the recommended dose. Too much acetaminophen (Tylenol) can be harmful. · Drink plenty of fluids. Fluids may help soothe an irritated throat. Hot fluids, such as tea or soup, may help decrease throat pain. · Use over-the-counter throat lozenges to soothe pain. Regular cough drops or hard candy may also help. These should not be given to young children because of the risk of choking. · Do not smoke or allow others to smoke around you. If you need help quitting, talk to your doctor about stop-smoking programs and medicines.  These can increase your chances of quitting for good. · Use a vaporizer or humidifier to add moisture to your bedroom. Follow the directions for cleaning the machine. When should you call for help? Call your doctor now or seek immediate medical care if:    · You have new or worse trouble swallowing.     · Your sore throat gets much worse on one side.    Watch closely for changes in your health, and be sure to contact your doctor if you do not get better as expected. Where can you learn more? Go to http://leyda-brennan.info/. Enter [id number] [id number] in the search box to learn more about \"Sore Throat: Care Instructions. \"  Current as of: March 28, 2018  Content Version: 11.8  © 5567-4840 [employer]. Care instructions adapted under license by [employer] (which disclaims liability or warranty for this information). If you have questions about a medical condition or this instruction, always ask your healthcare professional. [address] any warranty or liability for your use of this information.

## 2018-11-10 LAB
BACTERIA SPEC CULT: NORMAL
BACTERIA SPEC CULT: NORMAL
SERVICE CMNT-IMP: NORMAL

## 2018-11-15 ENCOUNTER — TELEPHONE (OUTPATIENT)
Dept: FAMILY MEDICINE CLINIC | Age: 43
End: 2018-11-15

## 2018-11-15 NOTE — TELEPHONE ENCOUNTER
Patient would like to have to a referral for Cardiology and Sleep specialists. Patient will call the offices to make the appointments so we can send the referral order.

## 2018-11-16 NOTE — PROGRESS NOTES
Thyroid is normal, cholesterol looks good. Platelets are low but looking back at previous labs, they tend to run chronically low. Will recheck in one month.

## 2018-11-20 NOTE — TELEPHONE ENCOUNTER
Has anything come across regarding referrals needed to these specialists?  Based on the message, it sounds like the patient is supposed to call us with names of specialists

## 2018-12-20 ENCOUNTER — TELEPHONE (OUTPATIENT)
Dept: FAMILY MEDICINE CLINIC | Age: 43
End: 2018-12-20

## 2018-12-20 NOTE — TELEPHONE ENCOUNTER
Patient call to let the office know that the referral can be sent to [facility] in [place]. Patient would like to an first available appointment with them for heart papulation . Patient would like to also have her sleep study referral to Dr. Korey Coffey at their [place] location.

## 2018-12-21 DIAGNOSIS — R07.9 CHEST PAIN, UNSPECIFIED TYPE: ICD-10-CM

## 2018-12-21 DIAGNOSIS — R06.83 SNORING: Primary | ICD-10-CM

## 2019-01-25 ENCOUNTER — OFFICE VISIT (OUTPATIENT)
Dept: FAMILY MEDICINE CLINIC | Age: 44
End: 2019-01-25

## 2019-01-25 ENCOUNTER — HOSPITAL ENCOUNTER (OUTPATIENT)
Dept: LAB | Age: 44
Discharge: HOME | End: 2019-01-25
Payer: COMMERCIAL

## 2019-01-25 VITALS
HEART RATE: 71 BPM | HEIGHT: 64 IN | DIASTOLIC BLOOD PRESSURE: 85 MMHG | SYSTOLIC BLOOD PRESSURE: 136 MMHG | OXYGEN SATURATION: 100 % | WEIGHT: 242 LBS | TEMPERATURE: 98.3 F | RESPIRATION RATE: 18 BRPM | BODY MASS INDEX: 41.32 KG/M2

## 2019-01-25 DIAGNOSIS — M62.89 MUSCLE TIGHTNESS: ICD-10-CM
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
## 2019-01-25 PROCEDURE — 82728 ASSAY OF FERRITIN: CPT

## 2019-01-25 PROCEDURE — 82607 VITAMIN B-12: CPT

## 2019-01-25 RX ORDER — IBUPROFEN 800 MG/1
800 TABLET ORAL
Qty: 60 TAB | Refills: 0 | Status: SHIPPED | OUTPATIENT
Start: 2019-01-25
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
## 2019-01-25 NOTE — PATIENT INSTRUCTIONS
Learning About Relief for Back Pain  What is back tension and strain? Back strain happens when you overstretch, or pull, a muscle in your back. You may hurt your back in an accident or when you exercise or lift something. Most back pain will get better with rest and time. You can take care of yourself at home to help your back heal.  What can you do first to relieve back pain? When you first feel back pain, try these steps:  · Walk. Take a short walk (10 to 20 minutes) on a level surface (no slopes, hills, or stairs) every 2 to 3 hours. Walk only distances you can manage without pain, especially leg pain. · Relax. Find a comfortable position for rest. Some people are comfortable on the floor or a medium-firm bed with a small pillow under their head and another under their knees. Some people prefer to lie on their side with a pillow between their knees. Don't stay in one position for too long. · Try heat or ice. Try using a heating pad on a low or medium setting, or take a warm shower, for 15 to 20 minutes every 2 to 3 hours. Or you can buy single-use heat wraps that last up to 8 hours. You can also try an ice pack for 10 to 15 minutes every 2 to 3 hours. You can use an ice pack or a bag of frozen vegetables wrapped in a thin towel. There is not strong evidence that either heat or ice will help, but you can try them to see if they help. You may also want to try switching between heat and cold. · Take pain medicine exactly as directed. ? If the doctor gave you a prescription medicine for pain, take it as prescribed. ? If you are not taking a prescription pain medicine, ask your doctor if you can take an over-the-counter medicine. What else can you do? · Stretch and exercise. Exercises that increase flexibility may relieve your pain and make it easier for your muscles to keep your spine in a good, neutral position. And don't forget to keep walking. · Do self-massage.  You can use self-massage to unwind after work or school or to energize yourself in the morning. You can easily massage your feet, hands, or neck. Self-massage works best if you are in comfortable clothes and are sitting or lying in a comfortable position. Use oil or lotion to massage bare skin. · Reduce stress. Back pain can lead to a vicious [place]: Distress about the pain tenses the muscles in your back, which in turn causes more pain. Learn how to relax your mind and your muscles to lower your stress. Where can you learn more? Go to http://leyda-brennan.info/. Enter P752 in the search box to learn more about \"Learning About Relief for Back Pain. \"  Current as of: September 20, 2018  Content Version: 11.9  © 4427-8018 [employer]. Care instructions adapted under license by [employer] (which disclaims liability or warranty for this information). If you have questions about a medical condition or this instruction, always ask your healthcare professional. [place] [address] any warranty or liability for your use of this information.
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
## 2019-01-26 LAB
FERRITIN SERPL-MCNC: 106 NG/ML (ref 8–388)
VIT B12 SERPL-MCNC: 396 PG/ML (ref 211–911)

## 2019-02-08 ENCOUNTER — TELEPHONE (OUTPATIENT)
Dept: FAMILY MEDICINE CLINIC | Age: 44
End: 2019-02-08

## 2019-02-08 DIAGNOSIS — M62.89 MUSCLE TIGHTNESS: ICD-10-CM

## 2019-02-08 DIAGNOSIS — R52 WHOLE BODY PAIN: ICD-10-CM
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
## 2019-02-08 NOTE — TELEPHONE ENCOUNTER
Patient is requesting to have a referral to Physical therapy for her leg discomfort. Please send to [facility].

## 2019-02-12 NOTE — TELEPHONE ENCOUNTER
Patient called stating the the appointment times with [facility] does not work with her work schedule and is requesting her referral to be sent to [facility]. Facility phone number is [phone].

## 2019-02-15 ENCOUNTER — APPOINTMENT (OUTPATIENT)
Dept: PHYSICAL THERAPY | Age: 44
End: 2019-02-15
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
## 2019-02-15 DIAGNOSIS — R52 PAIN: ICD-10-CM

## 2019-02-15 DIAGNOSIS — M62.9 DISORDER OF MUSCLE, UNSPECIFIED: ICD-10-CM

## 2019-04-29 ENCOUNTER — OFFICE VISIT (OUTPATIENT)
Dept: FAMILY MEDICINE CLINIC | Age: 44
End: 2019-04-29

## 2019-04-29 VITALS
DIASTOLIC BLOOD PRESSURE: 79 MMHG | OXYGEN SATURATION: 100 % | TEMPERATURE: 99 F | BODY MASS INDEX: 40.97 KG/M2 | HEIGHT: 64 IN | SYSTOLIC BLOOD PRESSURE: 131 MMHG | HEART RATE: 75 BPM | WEIGHT: 240 LBS | RESPIRATION RATE: 18 BRPM

## 2019-04-29 DIAGNOSIS — J02.9 SORE THROAT: ICD-10-CM

## 2019-04-29 DIAGNOSIS — J02.0 STREP PHARYNGITIS: Primary | ICD-10-CM

## 2019-04-29 LAB
S PYO AG THROAT QL: POSITIVE
VALID INTERNAL CONTROL?: YES

## 2019-04-29 RX ORDER — AMOXICILLIN 500 MG/1
500 CAPSULE ORAL 2 TIMES DAILY
Qty: 20 CAP | Refills: 0 | Status: SHIPPED | OUTPATIENT
Start: 2019-04-29 | End: 2019-05-09

## 2019-04-29 NOTE — PROGRESS NOTES
Patient: Luis Romo  Date of Service: 4/29/2019   Provider: KAISER Rivas     REASON FOR VISIT:   Chief Complaint   Patient presents with    Sore Throat        HISTORY OF PRESENT ILLNESS:   Luis Romo is a 44 y.o. female who presents to the office for acute care. Present with c/o sore throat x one week. Has sick contact. Also c/o itching ears. Denies fever, chills, or congestion. ALLERGIES:   No Known Allergies     ACTIVE MEDICAL PROBLEMS:  Patient Active Problem List   Diagnosis Code    ITP (idiopathic thrombocytopenic purpura) D69.3    Leg swelling M79.89    Obesity, morbid ([facility]) E66.01        MEDICATIONS:   Current Outpatient Medications   Medication Sig Dispense Refill    amoxicillin (AMOXIL) 500 mg capsule Take 1 Cap by mouth two (2) times a day for 10 days. 20 Cap 0    ibuprofen (MOTRIN) 800 mg tablet Take 1 Tab by mouth every eight (8) hours as needed for Pain. 60 Tab 0    spironolactone (ALDACTONE) 50 mg tablet Take  by mouth daily.  finasteride (PROSCAR) 5 mg tablet Take 5 mg by mouth daily. ROS:   Pertinent positive as above in HPI. All others negative. PHYSICAL EXAMINATION:  Visit Vitals  /79 (BP 1 Location: Left arm, BP Patient Position: Sitting)   Pulse 75   Temp 99 °F (37.2 °C) (Oral)   Resp 18   Ht 5' 4\" (1.626 m)   Wt 240 lb (108.9 kg)   SpO2 100%   BMI 41.20 kg/m²      Body mass index is 41.2 kg/m². Appearance: alert, well appearing, and in no distress. Ears: bilateral TM's and external ear canals normal  Oropharynx: mucous membranes moist, pharynx normal without lesions  CV: normal heart rate   Neck: supple, no significant adenopathy  Lungs: clear to auscultation, no wheezes, rales or rhonchi, symmetric air entry  Neurological is normal, no focal findings. Rapid Strep test is positive     ASSESSMENT/PLAN:  Diagnoses and all orders for this visit:    1. Strep pharyngitis  -     amoxicillin (AMOXIL) 500 mg capsule;  Take 1 Cap by mouth two (2) times a day for 10 days. 2. Sore throat  -     AMB POC RAPID STREP A    Gargle with salt water. Patient advised to return to clinic if symptoms persist or to ED if they become worse. atient verbalized understanding to above instructions. Follow-up and Dispositions    · Return if symptoms worsen or fail to improve.         KAISER Hansen   4/29/2019   4:55 PM

## 2019-04-29 NOTE — PROGRESS NOTES
Sheree Billy is a 44 y.o. female here today with c/o sore throat x 1 week. She started off with a dry throat but it has gotten worse and her daughter was dx with strep last week. Patient denies any fever.

## 2019-04-29 NOTE — LETTER
NOTIFICATION RETURN TO WORK / SCHOOL 
 
4/29/2019 5:07 PM 
 
Ms. Suzan Marin [address] [place] [id number] To Whom It May Concern: 
 
Suzan Marin is currently under the care of Lex Santana. She will return to work/school on Tuesday April 30, 2019 for her normal shift. If there are questions or concerns please have the patient contact our office. Sincerely, KAISER Howell

## 2019-04-29 NOTE — PATIENT INSTRUCTIONS
Sore Throat: Care Instructions  Your Care Instructions    Infection by bacteria or a virus causes most sore throats. Cigarette smoke, dry air, air pollution, allergies, and yelling can also cause a sore throat. Sore throats can be painful and annoying. Fortunately, most sore throats go away on their own. If you have a bacterial infection, your doctor may prescribe antibiotics. Follow-up care is a key part of your treatment and safety. Be sure to make and go to all appointments, and call your doctor if you are having problems. It's also a good idea to know your test results and keep a list of the medicines you take. How can you care for yourself at home? · If your doctor prescribed antibiotics, take them as directed. Do not stop taking them just because you feel better. You need to take the full course of antibiotics. · Gargle with warm salt water once an hour to help reduce swelling and relieve discomfort. Use 1 teaspoon of salt mixed in 1 cup of warm water. · Take an over-the-counter pain medicine, such as acetaminophen (Tylenol), ibuprofen (Advil, Motrin), or naproxen (Aleve). Read and follow all instructions on the label. · Be careful when taking over-the-counter cold or flu medicines and Tylenol at the same time. Many of these medicines have acetaminophen, which is Tylenol. Read the labels to make sure that you are not taking more than the recommended dose. Too much acetaminophen (Tylenol) can be harmful. · Drink plenty of fluids. Fluids may help soothe an irritated throat. Hot fluids, such as tea or soup, may help decrease throat pain. · Use over-the-counter throat lozenges to soothe pain. Regular cough drops or hard candy may also help. These should not be given to young children because of the risk of choking. · Do not smoke or allow others to smoke around you. If you need help quitting, talk to your doctor about stop-smoking programs and medicines.  These can increase your chances of quitting for good. · Use a vaporizer or humidifier to add moisture to your bedroom. Follow the directions for cleaning the machine. When should you call for help? Call your doctor now or seek immediate medical care if:    · You have new or worse trouble swallowing.     · Your sore throat gets much worse on one side.    Watch closely for changes in your health, and be sure to contact your doctor if you do not get better as expected. Where can you learn more? Go to http://leyda-brennan.info/. Enter [phone] in the search box to learn more about \"Sore Throat: Care Instructions. \"  Current as of: March 27, 2018  Content Version: 11.9  © 3445-1337 [employer]. Care instructions adapted under license by [employer] (which disclaims liability or warranty for this information). If you have questions about a medical condition or this instruction, always ask your healthcare professional. [address] any warranty or liability for your use of this information.

## 2019-06-12 ENCOUNTER — OFFICE VISIT (OUTPATIENT)
Dept: FAMILY MEDICINE CLINIC | Age: 44
End: 2019-06-12

## 2019-06-12 VITALS
OXYGEN SATURATION: 100 % | SYSTOLIC BLOOD PRESSURE: 120 MMHG | RESPIRATION RATE: 18 BRPM | BODY MASS INDEX: 40.8 KG/M2 | HEIGHT: 64 IN | DIASTOLIC BLOOD PRESSURE: 75 MMHG | TEMPERATURE: 98.6 F | HEART RATE: 83 BPM | WEIGHT: 239 LBS

## 2019-06-12 DIAGNOSIS — M25.511 ACUTE PAIN OF RIGHT SHOULDER: ICD-10-CM

## 2019-06-12 DIAGNOSIS — M89.8X1 PAIN OF RIGHT SCAPULA: Primary | ICD-10-CM

## 2019-06-12 DIAGNOSIS — R07.89 RIGHT-SIDED CHEST WALL PAIN: ICD-10-CM

## 2019-06-12 RX ORDER — CLOBETASOL PROPIONATE 0.46 MG/ML
SOLUTION TOPICAL 2 TIMES DAILY
COMMUNITY

## 2019-06-12 NOTE — PATIENT INSTRUCTIONS
Shoulder Blade: Exercises  Your Care Instructions  Here are some examples of typical exercises for your condition. Start each exercise slowly. Ease off the exercise if you start to have pain. Your doctor or physical therapist will tell you when you can start these exercises and which ones will work best for you. How to do the exercises  Shoulder roll    1. Stand tall with your chin slightly tucked. Imagine that a string at the top of your head is pulling you straight up. 2. Keep your arms relaxed. All motion will be in your shoulders. 3. Shrug your shoulders up toward your ears, then up and back. [place] your shoulders down and back, like you're sliding your hands down into your back pants pockets. 4. Repeat the circles at least 2 to 4 times. 5. This exercise is also helpful anytime you want to relax. Lower neck and upper back stretch    1. With your arms about shoulder height, clasp your hands in front of you. 2. Drop your chin toward your chest.  3. Reach straight forward so you are rounding your upper back. Think about pulling your shoulder blades apart. Moises Sanchez feel a stretch across your upper back and shoulders. Hold for at least 6 seconds. 4. Repeat 2 to 4 times. Triceps stretch    1. Reach your arm straight up. 2. Keeping your elbow in place, bend your arm and reach your hand down behind your back. 3. With your other hand, apply gentle pressure to the bent elbow. Moises Sanchez feel a stretch at the back of your upper arm and shoulder. Hold about 6 seconds. 4. Repeat 2 to 4 times with each arm. Shoulder stretch    1. Relax your shoulders. 2. Raise one arm to shoulder height, and reach it across your chest.  3. Pull the arm slightly toward you with your other arm. This will help you get a gentle stretch. Hold for about 6 seconds. 4. Repeat 2 to 4 times. Shoulder blade squeeze    1. Sit or stand up tall with your arms at your sides.   2. Keep your shoulders relaxed and down, not shrugged. 3. Squeeze your shoulder blades together. Hold for 6 seconds, then relax. 4. Repeat 8 to 12 times. Straight-arm shoulder blade squeeze    1. Sit or stand tall. Relax your shoulders. 2. With palms down, hold your elastic tubing or band straight out in front of you. 3. Start with slight tension in the tubing or band, with your hands about shoulder-width apart. 4. Slowly pull straight out to the sides, squeezing your shoulder blades together. Keep your arms straight and at shoulder height. Slowly release. 5. Repeat 8 to 12 times. Rowing    1. [place] your elastic tubing or band at about waist height. Take one end in each hand. 2. Sit or stand with your feet hip-width apart. 3. Hold your arms straight in front of you. Adjust your distance to create slight tension in the tubing or band. 4. Slightly tuck your chin. Relax your shoulders. 5. Without shrugging your shoulders, pull straight back. Your elbows will pass alongside your waist.    Pull-downs    1. [place] your elastic tubing or band in the top of a closed door. Take one end in each hand. 2. Either sit or stand, depending on what is more comfortable. If you feel unsteady, sit on a chair. 3. Start with your arms up and comfortably apart, elbows straight. There should be a slight tension in the tubing or band. 4. Slightly tuck your chin, and look straight ahead. 5. Keeping your back straight, slowly pull down and back, bending your elbows. 6. Stop where your hands are level with your chin, in a \"goalpost\" position. 7. Repeat 8 to 12 times. Chest T stretch    1. Lie on your back. Raise your knees so they are bent. Plant your feet on the floor, hip-width apart. 2. Tuck your chin, and relax your shoulders. 3. Reach your arms straight out to the sides. If you don't feel a mild stretch in your shoulders and across your chest, use a foam roll or a tightly rolled blanket under your spine, from your tailbone to your head.   4. Relax in this position for at least 15 to 30 seconds while you breathe normally. Repeat 2 to 4 times. 5. As you get used to this stretch, keep adding a little more time until you are able relax in this position for 2 or 3 minutes. When you can relax for at least 2 minutes, you only need to do the exercise 1 time per session. Chest goalpost stretch    1. Lie on your back. Raise your knees so they are bent. Plant your feet on the floor, hip-width apart. 2. Tuck your chin, and relax your shoulders. 3. Reach your arms straight out to the sides. 4. Bend your arms at the elbows, with your hands pointed toward the top of your head. Your arms should make an L on either side of your head. Your palms should be facing up. 5. If you don't feel a mild stretch in your shoulders and across your chest, use a foam roll or tightly rolled blanket under your spine, from your tailbone to your head. 6. Relax in this position for at least 15 to 30 seconds while you breathe normally. Repeat 2 to 4 times. 7. Each day you do this exercise, add a little more time until you can relax in this position for 2 or 3 minutes. When you can relax for at least 2 minutes, you only need to do the exercise 1 time per session. Follow-up care is a key part of your treatment and safety. Be sure to make and go to all appointments, and call your doctor if you are having problems. It's also a good idea to know your test results and keep a list of the medicines you take. Where can you learn more? Go to http://leyda-brennan.info/. Enter [phone] in the search box to learn more about \"Shoulder Blade: Exercises. \"  Current as of: September 20, 2018  Content Version: 11.9  © 5978-5617 [employer]. Care instructions adapted under license by [employer] (which disclaims liability or warranty for this information).  If you have questions about a medical condition or this instruction, always ask your healthcare professional. [employer] disclaims any warranty or liability for your use of this information. Shoulder Stretches: Exercises  Your Care Instructions  Here are some examples of exercises for your shoulder. Start each exercise slowly. Ease off the exercise if you start to have pain. Your doctor or physical therapist will tell you when you can start these exercises and which ones will work best for you. How to do the exercises  Shoulder stretch    1.  a doorway and place one arm against the door frame. Your elbow should be a little higher than your shoulder. 2. Relax your shoulders as you lean forward, allowing your chest and shoulder muscles to stretch. You can also turn your body slightly away from your arm to stretch the muscles even more. 3. Hold for 15 to 30 seconds. 4. Repeat 2 to 4 times with each arm. Shoulder and chest stretch    1. Shoulder and chest stretch  2. While sitting, relax your upper body so you slump slightly in your chair. 3. As you breathe in, straighten your back and open your arms out to the sides. 4. Gently pull your shoulder blades back and downward. 5. Hold for 15 to 30 seconds as your breathe normally. 6. Repeat 2 to 4 times. Overhead stretch    1. Reach up over your head with both arms. 2. Hold for 15 to 30 seconds. 3. Repeat 2 to 4 times. Follow-up care is a key part of your treatment and safety. Be sure to make and go to all appointments, and call your doctor if you are having problems. It's also a good idea to know your test results and keep a list of the medicines you take. Where can you learn more? Go to http://leyda-brennan.info/. Enter S254 in the search box to learn more about \"Shoulder Stretches: Exercises. \"  Current as of: September 20, 2018  Content Version: 11.9  © 3865-7794 [employer].  Care instructions adapted under license by [employer] (which disclaims liability or warranty for this information). If you have questions about a medical condition or this instruction, always ask your healthcare professional. [place] [address] any warranty or liability for your use of this information.

## 2019-06-12 NOTE — PROGRESS NOTES
Edith Melton is a 44 y.o. female here this morning with c/o right upper back pain that she thinks it may either be muscular or her lung. She says it started a couple of weeks ago. She does have a slight cough but denies any fever. This has happened before and she said it was muscle spasms.

## 2019-06-12 NOTE — PROGRESS NOTES
HISTORY OF PRESENT ILLNESS  Elizabeth Kunz is a 44 y.o. female. HPI  Elizabeth Kunz is a 44 y.o. female who presents to the office today for arm and chest pain  She comes in for an acute visit. There is right sided arm, chest, shoulder and back pain. This started a few weeks ago. There was no known injury. The pain is constant. It is a dull ache. It radiates up into the right side of the neck. She can feel the pain more with deep breath, movement of right shoulder, carrying or lifting on the right side. She has been taking motrin intermittently in the mornings if she remembers. There is no cough, SOB, sputum production, fever, chills, sweats, palpitations, dizziness. Chief Complaint   Patient presents with    Arm Pain    Chest Pain       Current Outpatient Medications on File Prior to Visit   Medication Sig Dispense Refill    clobetasol (TEMOVATE) 0.05 % external solution Apply  to affected area two (2) times a day.  ibuprofen (MOTRIN) 800 mg tablet Take 1 Tab by mouth every eight (8) hours as needed for Pain. 60 Tab 0    finasteride (PROSCAR) 5 mg tablet Take 5 mg by mouth daily.  spironolactone (ALDACTONE) 50 mg tablet Take  by mouth daily. No current facility-administered medications on file prior to visit.       No Known Allergies  Past Medical History:   Diagnosis Date    Anemia NEC     Depression     Nosebleed     Thrombocytopenia (HCC)      Social History     Tobacco Use   Smoking Status Current Some Day Smoker    Packs/day: 0.10    Last attempt to quit: 1/1/2004    Years since quitting: 15.4   Smokeless Tobacco Never Used     Social History     Substance and Sexual Activity   Alcohol Use Yes     Family History   Problem Relation Age of Onset    Hypertension Mother     Heart Disease Mother     Hypertension Father     Cancer Maternal Grandmother     Cancer Maternal Grandfather     Cancer Other         Maternal cousin       Review of Systems   Constitutional: Negative for chills, diaphoresis, fever and malaise/fatigue. Respiratory: Negative for cough, sputum production, shortness of breath and wheezing. Cardiovascular: Positive for chest pain. Negative for palpitations, orthopnea and leg swelling. Right sided   Gastrointestinal: Negative for nausea. Musculoskeletal: Positive for back pain, myalgias and neck pain. Right sided shoulder, scapula and neck pain   Skin: Negative for rash. Neurological: Negative for dizziness, tingling, weakness and headaches. Visit Vitals  /75 (BP 1 Location: Right arm, BP Patient Position: Sitting)   Pulse 83   Temp 98.6 °F (37 °C) (Oral)   Resp 18   Ht 5' 4\" (1.626 m)   Wt 239 lb (108.4 kg)   SpO2 100%   BMI 41.02 kg/m²     Physical Exam   Constitutional: She is oriented to person, place, and time. She appears well-developed and well-nourished. No distress. Neck: Normal range of motion. Neck supple. Pain Reproduced with left lateral extension and left lateral rotation of neck   Cardiovascular: Normal rate, regular rhythm and normal heart sounds. No extrasystoles are present. No murmur heard. Pulses:       Radial pulses are 2+ on the right side, and 2+ on the left side. Pulmonary/Chest: Effort normal and breath sounds normal. No respiratory distress. She has no wheezes. She has no rales. She exhibits tenderness. She exhibits no bony tenderness. Musculoskeletal:        Right shoulder: She exhibits tenderness and pain. She exhibits normal range of motion, no bony tenderness, no spasm, normal pulse and normal strength. Cervical back: She exhibits tenderness. She exhibits normal range of motion, no bony tenderness, no swelling and no spasm. Back:         Arms:  Pain reproduced with with movement and rotation of right shoulder   Neurological: She is alert and oriented to person, place, and time. Skin: Skin is warm and dry. Psychiatric: She has a normal mood and affect.  Her behavior is normal. Thought content normal.   Nursing note and vitals reviewed. ASSESSMENT and PLAN    ICD-10-CM ICD-9-CM    1. Pain of right scapula M89.8X1 733.90    2. Right-sided chest wall pain R07.89 786.52    3. Acute pain of right shoulder M25.511 719.41       Sounds like musculoskeletal pain based on exam. She already has motrin and robaxin at home; I have asked that she take Ibuprofen and Robaxin as previously prescribed for the next 3-5 days, along with applying heat and performing stretches of the neck, shoulder/scapula. If she develops fever, SOB, palpitations, radiation of chest pain or any other concerning symptoms please RTC  Reviewed medication and side effects. Patient agrees with the plan and verbalizes understanding.      Bharat Redding PA-C  6/12/2019

## 2019-09-27 ENCOUNTER — HOSPITAL ENCOUNTER (OUTPATIENT)
Dept: ULTRASOUND IMAGING | Age: 44
Discharge: HOME | End: 2019-09-27
Attending: PLASTIC SURGERY
Payer: COMMERCIAL

## 2019-09-27 ENCOUNTER — OFFICE VISIT (OUTPATIENT)
Dept: FAMILY MEDICINE CLINIC | Age: 44
End: 2019-09-27

## 2019-09-27 VITALS
RESPIRATION RATE: 18 BRPM | HEIGHT: 64 IN | HEART RATE: 94 BPM | WEIGHT: 240 LBS | SYSTOLIC BLOOD PRESSURE: 126 MMHG | DIASTOLIC BLOOD PRESSURE: 87 MMHG | BODY MASS INDEX: 40.97 KG/M2 | TEMPERATURE: 98.5 F | OXYGEN SATURATION: 98 %

## 2019-09-27 DIAGNOSIS — K42.9 UMBILICAL HERNIA: ICD-10-CM

## 2019-09-27 DIAGNOSIS — Z23 ENCOUNTER FOR IMMUNIZATION: ICD-10-CM

## 2019-09-27 DIAGNOSIS — R19.8 ABDOMINAL FULLNESS: Primary | ICD-10-CM

## 2019-09-27 DIAGNOSIS — E66.01 CLASS 3 SEVERE OBESITY DUE TO EXCESS CALORIES WITH BODY MASS INDEX (BMI) OF 40.0 TO 44.9 IN ADULT, UNSPECIFIED WHETHER SERIOUS COMORBIDITY PRESENT (HCC): ICD-10-CM

## 2019-09-27 PROCEDURE — 76705 ECHO EXAM OF ABDOMEN: CPT

## 2019-09-27 NOTE — PATIENT INSTRUCTIONS

## 2019-09-27 NOTE — PROGRESS NOTES
Kathy Mcneil is a 38 y.o. female  She needs an order for a mammogram and referral for PAP. She is c/o upper abdominal discomfort and feeling full quickly, GI told her she should have an US done because he thinks she has a hiatal hernia, she has hx of issues with her liver and an enlarged spleen, Dr. June Centeno.

## 2019-09-27 NOTE — PROGRESS NOTES
Jessica Tsai is a 38 y.o. female who presents to the office today for stomach pain  She comes in today for stomach issue. She saw a bariatric surgeon in [place] for weight loss. She was c/o of a fullness sensation in her stomach, lump in her stomach and reflux after eating. He discussed possible hiatal hernia and ordered an abdominal US. She has not had this done yet. She is scheduled for her screening mammo in october   She is due for PAP smear  Wants the flu vaccine today  She is still seeing derm for hair loss- had labs checked and thyroid is normal. She is getting steroid injections in her scalp     Chief Complaint   Patient presents with    Possible Hernia     hiatal       Current Outpatient Medications on File Prior to Visit   Medication Sig Dispense Refill    clobetasol (TEMOVATE) 0.05 % external solution Apply  to affected area two (2) times a day.  ibuprofen (MOTRIN) 800 mg tablet Take 1 Tab by mouth every eight (8) hours as needed for Pain. 60 Tab 0    spironolactone (ALDACTONE) 50 mg tablet Take  by mouth daily.  finasteride (PROSCAR) 5 mg tablet Take 5 mg by mouth daily. No current facility-administered medications on file prior to visit. No Known Allergies  Past Medical History:   Diagnosis Date    Anemia NEC     Depression     Nosebleed     Thrombocytopenia (HCC)      Social History     Tobacco Use   Smoking Status Current Some Day Smoker    Packs/day: 0.10    Last attempt to quit: 1/1/2004    Years since quitting: 15.7   Smokeless Tobacco Never Used     Social History     Substance and Sexual Activity   Alcohol Use Yes     Family History   Problem Relation Age of Onset    Hypertension Mother     Heart Disease Mother     Hypertension Father     Cancer Maternal Grandmother     Cancer Maternal Grandfather     Cancer Other         Maternal cousin       Review of Systems   Constitutional: Negative for malaise/fatigue and weight loss.    Respiratory: Negative for shortness of breath. Cardiovascular: Negative for chest pain. Gastrointestinal: Positive for abdominal pain and heartburn. Negative for constipation, diarrhea, nausea and vomiting. Neurological: Negative for dizziness and headaches. Psychiatric/Behavioral: Negative for depression. The patient is not nervous/anxious. Visit Vitals  /87 (BP 1 Location: Left arm, BP Patient Position: Sitting)   Pulse 94   Temp 98.5 °F (36.9 °C) (Oral)   Resp 18   Ht 5' 4\" (1.626 m)   Wt 240 lb (108.9 kg)   SpO2 98%   BMI 41.20 kg/m²     Physical Exam   Constitutional: She is oriented to person, place, and time and well-developed, well-nourished, and in no distress. Neck: Neck supple. No thyromegaly present. Cardiovascular: Normal rate, regular rhythm and normal heart sounds. Pulmonary/Chest: Effort normal and breath sounds normal.   Abdominal: Soft. She exhibits no distension. There is no tenderness. Musculoskeletal: She exhibits no edema. Neurological: She is alert and oriented to person, place, and time. Gait normal.   Skin: Skin is warm and dry. Psychiatric: Mood and affect normal.   Nursing note and vitals reviewed. Assessment and Plan    ICD-10-CM ICD-9-CM    1. Abdominal fullness R19.8 789.9    2. Class 3 severe obesity due to excess calories with body mass index (BMI) of 40.0 to 44.9 in adult, unspecified whether serious comorbidity present ([facility] E66.01 278.01     Z68.41 V85.41    3. Encounter for immunization Z23 V03.89 INFLUENZA VIRUS VAC QUAD,SPLIT,PRESV FREE SYRINGE IM       She will schedule for the abdominal US ordered by the bariatric surgeon to evaluate for hiatal hernia  She is still deciding on what to do in regards to weight loss surgery. Flu shot today  She already has her mammogram scheduled next month. She will schedule an appt for her PAP smear. Patient agrees with the plan and verbalizes understanding. Follow-up and Dispositions    · Return for [facility]/PAP smear. Marcia Jackson PA-C  9/27/2019    PLEASE NOTE:  This document has been produced using voice recognition software. Unrecognized errors in transcription may be present.

## 2019-10-03 ENCOUNTER — TELEPHONE (OUTPATIENT)
Dept: FAMILY MEDICINE CLINIC | Age: 44
End: 2019-10-03

## 2019-10-03 NOTE — TELEPHONE ENCOUNTER
Patient is calling would like to know the results of her [address]. Please call once they have been reviewed.